# Patient Record
Sex: MALE | Race: WHITE | HISPANIC OR LATINO | Employment: OTHER | URBAN - METROPOLITAN AREA
[De-identification: names, ages, dates, MRNs, and addresses within clinical notes are randomized per-mention and may not be internally consistent; named-entity substitution may affect disease eponyms.]

---

## 2024-09-30 ENCOUNTER — APPOINTMENT (OUTPATIENT)
Dept: GENERAL RADIOLOGY | Facility: CLINIC | Age: 29
End: 2024-09-30
Attending: EMERGENCY MEDICINE

## 2024-09-30 ENCOUNTER — HOSPITAL ENCOUNTER (EMERGENCY)
Facility: CLINIC | Age: 29
Discharge: HOME | End: 2024-09-30
Attending: EMERGENCY MEDICINE | Admitting: EMERGENCY MEDICINE

## 2024-09-30 VITALS
HEART RATE: 69 BPM | SYSTOLIC BLOOD PRESSURE: 123 MMHG | RESPIRATION RATE: 18 BRPM | TEMPERATURE: 98.6 F | OXYGEN SATURATION: 100 % | DIASTOLIC BLOOD PRESSURE: 76 MMHG

## 2024-09-30 DIAGNOSIS — S69.91XA FINGER INJURY, RIGHT, INITIAL ENCOUNTER: ICD-10-CM

## 2024-09-30 LAB
BASOPHILS # BLD AUTO: 0.1 10E3/UL (ref 0–0.2)
BASOPHILS NFR BLD AUTO: 1 %
EOSINOPHIL # BLD AUTO: 0.1 10E3/UL (ref 0–0.7)
EOSINOPHIL NFR BLD AUTO: 1 %
ERYTHROCYTE [DISTWIDTH] IN BLOOD BY AUTOMATED COUNT: 12.5 % (ref 10–15)
HCT VFR BLD AUTO: 43.2 % (ref 40–53)
HGB BLD-MCNC: 14.7 G/DL (ref 13.3–17.7)
HOLD SPECIMEN: NORMAL
IMM GRANULOCYTES # BLD: 0 10E3/UL
IMM GRANULOCYTES NFR BLD: 0 %
LYMPHOCYTES # BLD AUTO: 2.3 10E3/UL (ref 0.8–5.3)
LYMPHOCYTES NFR BLD AUTO: 30 %
MCH RBC QN AUTO: 30.4 PG (ref 26.5–33)
MCHC RBC AUTO-ENTMCNC: 34 G/DL (ref 31.5–36.5)
MCV RBC AUTO: 89 FL (ref 78–100)
MONOCYTES # BLD AUTO: 0.6 10E3/UL (ref 0–1.3)
MONOCYTES NFR BLD AUTO: 7 %
NEUTROPHILS # BLD AUTO: 4.7 10E3/UL (ref 1.6–8.3)
NEUTROPHILS NFR BLD AUTO: 61 %
NRBC # BLD AUTO: 0 10E3/UL
NRBC BLD AUTO-RTO: 0 /100
PLATELET # BLD AUTO: 206 10E3/UL (ref 150–450)
RBC # BLD AUTO: 4.84 10E6/UL (ref 4.4–5.9)
WBC # BLD AUTO: 7.7 10E3/UL (ref 4–11)

## 2024-09-30 PROCEDURE — 73130 X-RAY EXAM OF HAND: CPT | Mod: RT

## 2024-09-30 PROCEDURE — 99284 EMERGENCY DEPT VISIT MOD MDM: CPT

## 2024-09-30 PROCEDURE — 36415 COLL VENOUS BLD VENIPUNCTURE: CPT | Performed by: EMERGENCY MEDICINE

## 2024-09-30 PROCEDURE — 250N000013 HC RX MED GY IP 250 OP 250 PS 637: Performed by: EMERGENCY MEDICINE

## 2024-09-30 PROCEDURE — 85004 AUTOMATED DIFF WBC COUNT: CPT | Performed by: EMERGENCY MEDICINE

## 2024-09-30 RX ORDER — IBUPROFEN 600 MG/1
600 TABLET, FILM COATED ORAL ONCE
Status: COMPLETED | OUTPATIENT
Start: 2024-09-30 | End: 2024-09-30

## 2024-09-30 RX ADMIN — IBUPROFEN 600 MG: 600 TABLET ORAL at 14:19

## 2024-09-30 ASSESSMENT — COLUMBIA-SUICIDE SEVERITY RATING SCALE - C-SSRS
6. HAVE YOU EVER DONE ANYTHING, STARTED TO DO ANYTHING, OR PREPARED TO DO ANYTHING TO END YOUR LIFE?: NO
2. HAVE YOU ACTUALLY HAD ANY THOUGHTS OF KILLING YOURSELF IN THE PAST MONTH?: NO
1. IN THE PAST MONTH, HAVE YOU WISHED YOU WERE DEAD OR WISHED YOU COULD GO TO SLEEP AND NOT WAKE UP?: NO

## 2024-09-30 ASSESSMENT — ACTIVITIES OF DAILY LIVING (ADL)
ADLS_ACUITY_SCORE: 33
ADLS_ACUITY_SCORE: 35

## 2024-09-30 NOTE — ED TRIAGE NOTES
Nailed finger at home three days ago. Nail went thru Right index finger and into hand. Now has pain and swelling in middle finger. Pt pulled out nail, went to clinic and got a tetanus shot. Hand and finger has some swelling.

## 2024-09-30 NOTE — ED PROVIDER NOTES
Emergency Department Note      History of Present Illness     Chief Complaint   Hand Injury    HPI   Marcio Rojo is a 29 year old male presents to the emergency department with complaint of right middle finger pain and swelling.  Patient states that he injured his right index and middle finger with a nail gun on Friday.  He pulled the nail out himself at home.  He went to [facility] and got a tetanus vaccination at the time.  Patient states has had increased swelling of the middle finger with pain with flexion and extension but greatest with extension.  There is no redness or warmth.  No purulent drainage.  He denies fevers or chills.  No other injuries.    The history was obtained with a professional [place] interpretor.     Independent Historian   None    Review of External Notes   Viewed old notes in Care Everywhere with no old notes identified    Past Medical History     Medical History and Problem List   The patient denies any significant past medical history.     Medications   The patient is not currently taking any regular medications.      Physical Exam     Patient Vitals for the past 24 hrs:   BP Temp Temp src Pulse Resp SpO2   09/30/24 1319 123/76 98.6  F (37  C) Temporal 69 18 100 %     Physical Exam  General: Patient is alert and normal appearing.  HEENT: Head atraumatic    Eyes: pupils equal and reactive. Conjunctiva clear   Nares: patent   Oropharynx: no lesions, uvula midline, no palatal draping, normal voice, no trismus  Neck: Supple without lymphadenopathy, no meningismus  Chest: Heart regular rate and rhythm.   Lungs: Equal clear to auscultation with no wheeze or rales  Abdomen: Soft, non tender, nondistended, normal bowel sounds  Back: No costovertebral angle tenderness, no midline C, T or L spine tenderness  Neuro: Grossly nonfocal, normal speech, strength equal bilaterally, CN 2-12 intact  Extremities: No deformities, equal radial and DP pulses. No clubbing, cyanosis.  No edema  Right  hand: Scabbed over wound to the index and middle finger.  Circumferential swelling of the right middle finger without erythema or warmth.  Passive flexion extension possible of the digit.  No crepitus.  Cap refill less than 2 seconds at the distal finger.  Pain with active flexion and extension.  Skin: Warm and dry with no rash.       Diagnostics     Lab Results   Labs Ordered and Resulted from Time of ED Arrival to Time of ED Departure   CBC WITH PLATELETS AND DIFFERENTIAL       Result Value    WBC Count 7.7      RBC Count 4.84      Hemoglobin 14.7      Hematocrit 43.2      MCV 89      MCH 30.4      MCHC 34.0      RDW 12.5      Platelet Count 206      % Neutrophils 61      % Lymphocytes 30      % Monocytes 7      % Eosinophils 1      % Basophils 1      % Immature Granulocytes 0      NRBCs per 100 WBC 0      Absolute Neutrophils 4.7      Absolute Lymphocytes 2.3      Absolute Monocytes 0.6      Absolute Eosinophils 0.1      Absolute Basophils 0.1      Absolute Immature Granulocytes 0.0      Absolute NRBCs 0.0       Imaging   XR Hand Right G/E 3 Views   Preliminary Result   IMPRESSION: Normal. No evidence of fracture. No evidence of metallic   foreign body in the index or middle finger.        Independent Interpretation   I independently interpreted the hand XR, no evidence of fracture of foreign body.    ED Course      Medications Administered   Medications   ibuprofen (ADVIL/MOTRIN) tablet 600 mg (600 mg Oral $Given 9/30/24 1419)     Procedures   None     Discussion of Management   None    ED Course   ED Course as of 09/30/24 1459   Mon Sep 30, 2024   1400 I obtained the history and examined the patient as noted above.      1458 I rechecked and updated the patient.        Additional Documentation  None    Medical Decision Making / Diagnosis     CMS Diagnoses: None    MIPS       None    [facility]   Marcio Rojo is a 29 year old male patient with injury to right index and middle finger with a nail gun on Friday.  He  went to [facility] and had his tetanus vaccination updated.  He removed the nail himself.  Here in the emergency department wound is healed over.  X-ray without fracture or foreign body.  There is generalized swelling and tenderness with extension and flexion but no erythema or warmth.  Cap refills less than 2 seconds.  No tenderness with palpation along the flexor tendon.  More tenderness along the extensor region.  No significant hand swelling.  Able to passively flex and extend without significant pain but some pain with active flexion extension.  Patient will be put in a removable Ortho-Glass splint.  He will be started on Augmentin given deep puncture and possibility of infection.  Recommend very close follow-up in the next 1 to 2 days with hand surgery at [facility].  If he develops erythema, warmth or fever or increased swelling or pain he is asked to return to the emergency department.  No evidence of flexor tenosynovitis at this time.  This does remain in the differential as early infection is possible.  He is afebrile and has no leukocytosis.  No clinical evidence of flexor tenosynovitis at this time.  Suspect pain due to general swelling however this remains a possibility.  Will put him on Augmentin and have close follow-up with [facility] hand surgery.  Return precautions emergency department reviewed.    Disposition   The patient was discharged.     Diagnosis     ICD-10-CM    1. Finger injury, right, initial encounter  S69.91XA            Discharge Medications   New Prescriptions    No medications on file     Scribe Disclosure:  Rekha WATSON, am serving as a scribe at 2:13 PM on 9/30/2024 to document services personally performed by Ana Wilson MD based on my observations and the provider's statements to me.        Ana Wilson MD  09/30/24 [id number]